# Patient Record
Sex: FEMALE | Employment: OTHER | URBAN - METROPOLITAN AREA
[De-identification: names, ages, dates, MRNs, and addresses within clinical notes are randomized per-mention and may not be internally consistent; named-entity substitution may affect disease eponyms.]

---

## 2023-08-17 ENCOUNTER — OFFICE VISIT (OUTPATIENT)
Age: 73
End: 2023-08-17
Payer: COMMERCIAL

## 2023-08-17 VITALS
WEIGHT: 207 LBS | BODY MASS INDEX: 33.27 KG/M2 | SYSTOLIC BLOOD PRESSURE: 122 MMHG | TEMPERATURE: 97.9 F | HEIGHT: 66 IN | HEART RATE: 64 BPM | DIASTOLIC BLOOD PRESSURE: 77 MMHG | RESPIRATION RATE: 16 BRPM | OXYGEN SATURATION: 96 %

## 2023-08-17 DIAGNOSIS — Z76.89 ENCOUNTER TO ESTABLISH CARE: Primary | ICD-10-CM

## 2023-08-17 DIAGNOSIS — I10 ESSENTIAL HYPERTENSION: ICD-10-CM

## 2023-08-17 DIAGNOSIS — Z86.73 HISTORY OF STROKE: ICD-10-CM

## 2023-08-17 DIAGNOSIS — F03.A0 MILD DEMENTIA WITHOUT BEHAVIORAL DISTURBANCE, PSYCHOTIC DISTURBANCE, MOOD DISTURBANCE, OR ANXIETY, UNSPECIFIED DEMENTIA TYPE (HCC): ICD-10-CM

## 2023-08-17 PROCEDURE — 3074F SYST BP LT 130 MM HG: CPT | Performed by: NURSE PRACTITIONER

## 2023-08-17 PROCEDURE — 3078F DIAST BP <80 MM HG: CPT | Performed by: NURSE PRACTITIONER

## 2023-08-17 PROCEDURE — 1123F ACP DISCUSS/DSCN MKR DOCD: CPT | Performed by: NURSE PRACTITIONER

## 2023-08-17 PROCEDURE — 99214 OFFICE O/P EST MOD 30 MIN: CPT | Performed by: NURSE PRACTITIONER

## 2023-08-17 RX ORDER — UBIDECARENONE 100 MG
CAPSULE ORAL
COMMUNITY

## 2023-08-17 RX ORDER — DOXYCYCLINE HYCLATE 100 MG/1
100 CAPSULE ORAL 2 TIMES DAILY
COMMUNITY
Start: 2023-06-13

## 2023-08-17 RX ORDER — LANOLIN ALCOHOL/MO/W.PET/CERES
500 CREAM (GRAM) TOPICAL NIGHTLY
COMMUNITY

## 2023-08-17 RX ORDER — HYDROCHLOROTHIAZIDE 25 MG/1
1 TABLET ORAL DAILY
COMMUNITY
Start: 2023-05-19

## 2023-08-17 RX ORDER — ACETAMINOPHEN 500 MG
TABLET ORAL EVERY 6 HOURS PRN
COMMUNITY

## 2023-08-17 RX ORDER — FEXOFENADINE HCL 180 MG/1
TABLET ORAL
COMMUNITY

## 2023-08-17 RX ORDER — MEMANTINE HYDROCHLORIDE 5 MG/1
TABLET ORAL
COMMUNITY
Start: 2023-06-15

## 2023-08-17 RX ORDER — SAW/PYGEUM/BETA/HERB/D3/B6/ZN 30 MG-25MG
5 CAPSULE ORAL
COMMUNITY
End: 2023-08-17 | Stop reason: CLARIF

## 2023-08-17 RX ORDER — FLUTICASONE PROPIONATE 50 MCG
SPRAY, SUSPENSION (ML) NASAL
COMMUNITY
Start: 2022-05-03

## 2023-08-17 RX ORDER — EZETIMIBE 10 MG/1
1 TABLET ORAL NIGHTLY
COMMUNITY
Start: 2023-06-08

## 2023-08-17 SDOH — ECONOMIC STABILITY: HOUSING INSECURITY
IN THE LAST 12 MONTHS, WAS THERE A TIME WHEN YOU DID NOT HAVE A STEADY PLACE TO SLEEP OR SLEPT IN A SHELTER (INCLUDING NOW)?: PATIENT REFUSED

## 2023-08-17 SDOH — ECONOMIC STABILITY: INCOME INSECURITY: HOW HARD IS IT FOR YOU TO PAY FOR THE VERY BASICS LIKE FOOD, HOUSING, MEDICAL CARE, AND HEATING?: PATIENT DECLINED

## 2023-08-17 SDOH — ECONOMIC STABILITY: FOOD INSECURITY: WITHIN THE PAST 12 MONTHS, THE FOOD YOU BOUGHT JUST DIDN'T LAST AND YOU DIDN'T HAVE MONEY TO GET MORE.: PATIENT DECLINED

## 2023-08-17 SDOH — ECONOMIC STABILITY: FOOD INSECURITY: WITHIN THE PAST 12 MONTHS, YOU WORRIED THAT YOUR FOOD WOULD RUN OUT BEFORE YOU GOT MONEY TO BUY MORE.: PATIENT DECLINED

## 2023-08-17 ASSESSMENT — PATIENT HEALTH QUESTIONNAIRE - PHQ9
7. TROUBLE CONCENTRATING ON THINGS, SUCH AS READING THE NEWSPAPER OR WATCHING TELEVISION: 0
SUM OF ALL RESPONSES TO PHQ QUESTIONS 1-9: 8
SUM OF ALL RESPONSES TO PHQ QUESTIONS 1-9: 8
10. IF YOU CHECKED OFF ANY PROBLEMS, HOW DIFFICULT HAVE THESE PROBLEMS MADE IT FOR YOU TO DO YOUR WORK, TAKE CARE OF THINGS AT HOME, OR GET ALONG WITH OTHER PEOPLE: 1
5. POOR APPETITE OR OVEREATING: 0
2. FEELING DOWN, DEPRESSED OR HOPELESS: 1
SUM OF ALL RESPONSES TO PHQ QUESTIONS 1-9: 8
6. FEELING BAD ABOUT YOURSELF - OR THAT YOU ARE A FAILURE OR HAVE LET YOURSELF OR YOUR FAMILY DOWN: 1
4. FEELING TIRED OR HAVING LITTLE ENERGY: 1
SUM OF ALL RESPONSES TO PHQ QUESTIONS 1-9: 8
3. TROUBLE FALLING OR STAYING ASLEEP: 2
SUM OF ALL RESPONSES TO PHQ9 QUESTIONS 1 & 2: 1
9. THOUGHTS THAT YOU WOULD BE BETTER OFF DEAD, OR OF HURTING YOURSELF: 0
8. MOVING OR SPEAKING SO SLOWLY THAT OTHER PEOPLE COULD HAVE NOTICED. OR THE OPPOSITE, BEING SO FIGETY OR RESTLESS THAT YOU HAVE BEEN MOVING AROUND A LOT MORE THAN USUAL: 3
1. LITTLE INTEREST OR PLEASURE IN DOING THINGS: 0

## 2023-08-17 NOTE — PROGRESS NOTES
Chief Complaint   Patient presents with    New Patient       1. Have you been to the ER, urgent care clinic since your last visit? Hospitalized since your last visit?         no    2. Have you seen or consulted any other health care providers outside of the [address] [place] [address] since your last visit? Include any pap smears or colon screening. no    3. For patients over 45: Has the patient had a colonoscopy? No    If the patient is female:    4. For patients over 40: Has the patient had a mammogram? No    5. For patients over 21: Has the patient had a pap smear? N/A        No flowsheet data found. No flowsheet data found. Health Maintenance Due   Topic Date Due    Depression Screen  Never done    Hepatitis C screen  Never done    DTaP/Tdap/Td vaccine (1 - Tdap) Never done    Lipids  Never done    Colorectal Cancer Screen  Never done    Breast cancer screen  Never done    DEXA (modify frequency per FRAX score)  Never done    Shingles vaccine (2 of 2) 02/07/2019    COVID-19 Vaccine (3 - Booster for Moderna series) 05/27/2021    Flu vaccine (1) 08/01/2023       PHQ-9  8/17/2023   Little interest or pleasure in doing things 0   Feeling down, depressed, or hopeless 1   Trouble falling or staying asleep, or sleeping too much 2   Feeling tired or having little energy 1   Poor appetite or overeating 0   Feeling bad about yourself - or that you are a failure or have let yourself or your family down 1   Trouble concentrating on things, such as reading the newspaper or watching television 0   Moving or speaking so slowly that other people could have noticed.  Or the opposite - being so fidgety or restless that you have been moving around a lot more than usual 3   Thoughts that you would be better off dead, or of hurting yourself in some way 0   PHQ-2 Score 1   PHQ-9 Total Score 8   If you checked off any problems, how difficult have these problems made it for you to do your work, take care of things at home, or

## 2023-08-17 NOTE — PROGRESS NOTES
HPI:   Hernesto Canales is a 68 y.o. female who presents to establish care. She has moved from [place] this year to live with her daughter and son-in law. She is accompanied by her granddaughter. They are well without concerns. She lost her  in February this year. There is a history of dementia that has worsened over time. She does not drive. She has established with neurology. She is now living with her daughter. Current Outpatient Medications   Medication Sig Dispense Refill    Aspirin Buf,CaCarb-MgCarb-MgO, 81 MG TABS Take 1 tablet by mouth daily      Calcium 600-10 MG-MCG CHEW Take 2 capsules by mouth daily      fexofenadine (ALLEGRA) 180 MG tablet Take by mouth      Multiple Vitamin (MULTIVITAMIN ADULT PO) Take 1 tablet by mouth daily      Probiotic Product (PROBIOTIC-10 PO) Probiotic      Propylene Glycol 0.6 % SOLN Apply 1 drop to eye 2 times daily as needed      acetaminophen (TYLENOL) 500 MG tablet Take by mouth every 6 hours as needed      coenzyme Q10 100 MG CAPS capsule coenzyme Q10      doxycycline hyclate (VIBRAMYCIN) 100 MG capsule Take 1 capsule by mouth 2 times daily      ezetimibe (ZETIA) 10 MG tablet Take 1 tablet by mouth nightly      fluticasone (FLONASE) 50 MCG/ACT nasal spray SPRAY 2 SPRAYS IN EACH NOSTRIL DAILY      hydroCHLOROthiazide (HYDRODIURIL) 25 MG tablet Take 1 tablet by mouth daily      memantine (NAMENDA) 5 MG tablet Take 5mg once in the morning for one week. Then increase to 5mg twice a day (one in morning one at night) for one week. Then increase to 10mg in the morning and 5mg at night for one week. And finally increase to 10mg twice a day indefinitely. Metoprolol Succinate 100 MG CS24 Take 1 capsule every day by oral route. niacin (SLO-NIACIN) 500 MG extended release tablet Take 1 tablet by mouth nightly       No current facility-administered medications for this visit.       Allergies   Allergen Reactions    Wasp Venom Protein

## 2023-08-23 PROBLEM — I10 ESSENTIAL HYPERTENSION: Status: ACTIVE | Noted: 2023-08-23

## 2023-08-23 PROBLEM — F03.A0 MILD DEMENTIA WITHOUT BEHAVIORAL DISTURBANCE, PSYCHOTIC DISTURBANCE, MOOD DISTURBANCE, OR ANXIETY (HCC): Status: ACTIVE | Noted: 2023-08-23

## 2023-08-23 PROBLEM — Z86.73 HISTORY OF STROKE: Status: ACTIVE | Noted: 2023-08-23

## 2023-08-23 ASSESSMENT — ENCOUNTER SYMPTOMS: SHORTNESS OF BREATH: 0

## 2023-09-10 ENCOUNTER — PATIENT MESSAGE (OUTPATIENT)
Age: 73
End: 2023-09-10

## 2023-09-12 RX ORDER — SERTRALINE HYDROCHLORIDE 100 MG/1
150 TABLET, FILM COATED ORAL DAILY
Qty: 45 TABLET | Refills: 3 | Status: SHIPPED | OUTPATIENT
Start: 2023-09-12

## 2023-09-12 RX ORDER — ATORVASTATIN CALCIUM 20 MG/1
20 TABLET, FILM COATED ORAL DAILY
Qty: 30 TABLET | Refills: 3 | Status: SHIPPED | OUTPATIENT
Start: 2023-09-12

## 2023-09-13 NOTE — TELEPHONE ENCOUNTER
From: Nataly Fair  To: Sebastian Kota Dumont  Sent: 9/10/2023 11:46 AM EDT  Subject: Medication Clarification Cont. 3. By the Niacinamide, you judy a line and wrote,\"Pellagra treatment-discontinuing. \" However, it is still on the list of medications she should continue taking. Should I be giving Niacinamide to her or not? 4. By Focus Factor, Potassium Chloride, Centrum Silver, and Biotin you judy lines and indicated that they were optional. They were also not listed on the [address] [address] page, so I can stop giving all of these to her. Correct? Should I stop them all at once, or remove them gradually? 5. FYI: There is a red line through the Prevagen, but I'm not sure if you judy it or I did. In any case, I stopped giving that one to ANBAELLE on August 20, 2023. 6. Her new psychiatric , NP, [facility], had prescribed Lorazepam for her starting 8/8/23. It was only slightly helpful and helping her to sleep. Both you and the doctor we saw when we took MILLERDAL for her neuro-psychological assessment on 9/1/2023 noted that it can worsen her memory, and she seemed to be getting increasingly foggy during that time, so I stopped giving that to her. You noted we should consider alternatives. Do you have any suggestions? Thank you for any advise you can provide!     Sincerely,    Flex Romero

## 2023-11-17 ENCOUNTER — OFFICE VISIT (OUTPATIENT)
Age: 73
End: 2023-11-17
Payer: COMMERCIAL

## 2023-11-17 VITALS
BODY MASS INDEX: 32.66 KG/M2 | DIASTOLIC BLOOD PRESSURE: 80 MMHG | TEMPERATURE: 100 F | SYSTOLIC BLOOD PRESSURE: 138 MMHG | WEIGHT: 203.2 LBS | OXYGEN SATURATION: 97 % | RESPIRATION RATE: 16 BRPM | HEART RATE: 72 BPM | HEIGHT: 66 IN

## 2023-11-17 DIAGNOSIS — I10 ESSENTIAL HYPERTENSION: ICD-10-CM

## 2023-11-17 DIAGNOSIS — F02.A0 MILD DEMENTIA ASSOCIATED WITH OTHER UNDERLYING DISEASE, WITHOUT BEHAVIORAL DISTURBANCE, PSYCHOTIC DISTURBANCE, MOOD DISTURBANCE, OR ANXIETY (HCC): Primary | ICD-10-CM

## 2023-11-17 PROCEDURE — 3078F DIAST BP <80 MM HG: CPT | Performed by: NURSE PRACTITIONER

## 2023-11-17 PROCEDURE — 1123F ACP DISCUSS/DSCN MKR DOCD: CPT | Performed by: NURSE PRACTITIONER

## 2023-11-17 PROCEDURE — 3074F SYST BP LT 130 MM HG: CPT | Performed by: NURSE PRACTITIONER

## 2023-11-17 PROCEDURE — 99213 OFFICE O/P EST LOW 20 MIN: CPT | Performed by: NURSE PRACTITIONER

## 2023-11-17 RX ORDER — SERTRALINE HYDROCHLORIDE 100 MG/1
100 TABLET, FILM COATED ORAL DAILY
Qty: 30 TABLET | Refills: 3
Start: 2023-11-17

## 2023-11-17 RX ORDER — LEVOTHYROXINE SODIUM 0.03 MG/1
25 TABLET ORAL DAILY
COMMUNITY
Start: 2023-10-30

## 2023-11-17 RX ORDER — LOSARTAN POTASSIUM 100 MG/1
1 TABLET ORAL DAILY
COMMUNITY
Start: 2023-06-08

## 2023-11-17 RX ORDER — DIPHENHYDRAMINE HCL 25 MG
25 CAPSULE ORAL EVERY 6 HOURS PRN
COMMUNITY

## 2023-11-17 RX ORDER — DONEPEZIL HYDROCHLORIDE 5 MG/1
5 TABLET, FILM COATED ORAL NIGHTLY
COMMUNITY
Start: 2023-10-19

## 2023-11-17 NOTE — PROGRESS NOTES
Assessment/Plan:     1. 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 She is followed by neurology. 2. Essential hypertension   Stable on current therapy at this time. No follow-ups on file. Discussed expected course/resolution/complications of diagnosis in detail with patient. Medication risks/benefits/costs/interactions/alternatives discussed with patient. Pt was given after visit summary which includes diagnoses, current medications & vitals. Pt expressed understanding with the diagnosis and plan          Subjective:      Idalmis Orellana is a 68 y.o. female who presents for had concerns including Follow-up, Loss of Vision, and Skin Problem. She is accompanied by her daughter. Vision is getting worse. Followed by Dr. Matt Winston. Her dementia is worsening. They are considering home placement. Cardiovascular Review:  The patient has hypertension, hyperlipidemia, history of prior stroke, and obesity. Diet and Lifestyle: not attempting to follow a low fat, low cholesterol diet, sedentary  Home BP Monitoring: is not measured at home. Pertinent ROS: taking medications as instructed, no medication side effects noted, no TIA's, no chest pain on exertion, no dyspnea on exertion, no swelling of ankles.      Patient Active Problem List   Diagnosis    Mild dementia without behavioral disturbance, psychotic disturbance, mood disturbance, or anxiety ([facility])    History of stroke    Essential hypertension       Current Outpatient Medications   Medication Sig Dispense Refill    diphenhydrAMINE (BENADRYL ALLERGY) 25 MG capsule Take 1 capsule by mouth every 6 hours as needed      donepezil (ARICEPT) 5 MG tablet Take 1 tablet by mouth nightly      levothyroxine (SYNTHROID) 25 MCG tablet Take 1 tablet by mouth daily      losartan

## 2023-11-28 ENCOUNTER — TELEPHONE (OUTPATIENT)
Age: 73
End: 2023-11-28

## 2023-11-28 RX ORDER — HYDROCHLOROTHIAZIDE 25 MG/1
25 TABLET ORAL DAILY
Qty: 90 TABLET | Refills: 3 | Status: SHIPPED | OUTPATIENT
Start: 2023-11-28

## 2023-11-28 NOTE — TELEPHONE ENCOUNTER
Received a request for Ezatimibe 10mg. I did not see where this is a current medication . It seems it was discontinued.

## 2023-11-28 NOTE — TELEPHONE ENCOUNTER
Pharmacy called received prescription for metoprolol succinate 100 mg the capsule is not cover wants to know if it could be change to tablets.     Requesting a call back    Best call back #[phone]

## 2023-11-29 ASSESSMENT — ENCOUNTER SYMPTOMS: SHORTNESS OF BREATH: 0

## 2023-11-29 NOTE — TELEPHONE ENCOUNTER
Spoke with Poli Castillo at [facility] and informed her that the tablet is fine per Tim.

## 2023-12-28 NOTE — TELEPHONE ENCOUNTER
PCP: Evangelina Vasques, APRN - NP    Last appt: 11/17/2023   No future appointments.     Requested Prescriptions     Pending Prescriptions Disp Refills    atorvastatin (LIPITOR) 20 MG tablet [Pharmacy Med Name: ATORVASTATIN 20 MG TAB[*]] 30 tablet 3     Sig: TAKE ONE TABLET BY MOUTH ONE TIME DAILY         Prior labs and Blood pressures:  BP Readings from Last 3 Encounters:   11/17/23 138/80   08/17/23 122/77     No results found for: \"NA\", \"K\", \"CL\", \"CO2\", \"AGAP\", \"GLU\", \"BUN\", \"CREA\", \"GFRAA\", \"CA\"  No results found for: \"HBA1C\", \"[id number]\"  No results found for: \"CHOL\", \"CHOLPOCT\", \"CHOLX\", \"CHLST\", \"CHOLV\", \"HDL\", \"HDLPOC\", \"HDLC\", \"LDL\", \"LDLC\", \"VLDLC\", \"VLDL\", \"TGLX\", \"TRIGL\"  No results found for: \"VITD3\", \"VD3RIA\"    No results found for: \"TSH\", \"TSH2\", \"TSH3\"

## 2023-12-29 RX ORDER — ATORVASTATIN CALCIUM 20 MG/1
20 TABLET, FILM COATED ORAL DAILY
Qty: 30 TABLET | Refills: 3 | Status: SHIPPED | OUTPATIENT
Start: 2023-12-29

## 2024-03-12 NOTE — TELEPHONE ENCOUNTER
PCP: Laurie Dumont APRN - NP    Last appt: 11/17/2023     No future appointments.    Requested Prescriptions     Pending Prescriptions Disp Refills    losartan (COZAAR) 100 MG tablet [Pharmacy Med Name: LOSARTAN POTASSIUM 100 MG TAB] 90 tablet 1     Sig: TAKE ONE TABLET BY MOUTH ONE TIME DAILY       Prior labs and Blood pressures:  BP Readings from Last 3 Encounters:   11/17/23 138/80   08/17/23 122/77     No results found for: \"NA\", \"K\", \"CL\", \"CO2\", \"AGAP\", \"GLU\", \"BUN\", \"CREA\", \"GFRAA\", \"CA\"  No results found for: \"HBA1C\", \"[id number]\"  No results found for: \"CHOL\", \"CHOLPOCT\", \"CHOLX\", \"CHLST\", \"CHOLV\", \"HDL\", \"HDLPOC\", \"HDLC\", \"LDL\", \"LDLC\", \"VLDLC\", \"VLDL\", \"TGLX\", \"TRIGL\"  No results found for: \"VITD3\", \"VD3RIA\"    No results found for: \"TSH\", \"TSH2\", \"TSH3\"

## 2024-03-13 RX ORDER — LOSARTAN POTASSIUM 100 MG/1
100 TABLET ORAL DAILY
Qty: 90 TABLET | Refills: 1 | Status: SHIPPED | OUTPATIENT
Start: 2024-03-13

## 2024-03-15 NOTE — TELEPHONE ENCOUNTER
Left voicemail to callback please inform the pt that her Losartan 100 MG was approved.However pt need's to make an In Office Appt with TREY Dumont.

## 2024-03-25 RX ORDER — LOSARTAN POTASSIUM 100 MG/1
100 TABLET ORAL DAILY
Qty: 90 TABLET | Refills: 1 | Status: SHIPPED | OUTPATIENT
Start: 2024-03-25

## 2024-03-25 RX ORDER — SERTRALINE HYDROCHLORIDE 100 MG/1
TABLET, FILM COATED ORAL
Qty: 45 TABLET | Refills: 3 | Status: SHIPPED | OUTPATIENT
Start: 2024-03-25

## 2024-03-25 NOTE — TELEPHONE ENCOUNTER
PCP: Laurie Dumont APRN - NP    Last appt: 11/17/2023   Future Appointments   Date Time Provider Department Center   5/7/2024  1:45 PM Laurie Dumont APRN - NP [facility]       Requested Prescriptions     Pending Prescriptions Disp Refills    sertraline (ZOLOFT) 100 MG tablet [Pharmacy Med Name: SERTRALINE 100 MG TAB[*]] 45 tablet 3     Sig: TAKE ONE AND ONE-HALF TABLETS BY MOUTH ONE TIME DAILY    losartan (COZAAR) 100 MG tablet [Pharmacy Med Name: LOSARTAN POTASSIUM 100 MG TAB] 90 tablet 1     Sig: TAKE ONE TABLET BY MOUTH ONE TIME DAILY         Prior labs and Blood pressures:  BP Readings from Last 3 Encounters:   11/17/23 138/80   08/17/23 122/77     No results found for: \"NA\", \"K\", \"CL\", \"CO2\", \"AGAP\", \"GLU\", \"BUN\", \"CREA\", \"GFRAA\", \"CA\"  No results found for: \"HBA1C\", \"[id number]\"  No results found for: \"CHOL\", \"CHOLPOCT\", \"CHOLX\", \"CHLST\", \"CHOLV\", \"HDL\", \"HDLPOC\", \"HDLC\", \"LDL\", \"LDLC\", \"VLDLC\", \"VLDL\", \"TGLX\", \"TRIGL\"  No results found for: \"VITD3\", \"VD3RIA\"    No results found for: \"TSH\", \"TSH2\", \"TSH3\"

## 2024-04-23 NOTE — TELEPHONE ENCOUNTER
PCP: Laurie Dumont APRN - NP    Last appt: 11/17/2023   Future Appointments   Date Time Provider Department Center   5/7/2024  1:45 PM Laurie Dumont APRN - NP [facility]       Requested Prescriptions     Pending Prescriptions Disp Refills    atorvastatin (LIPITOR) 20 MG tablet [Pharmacy Med Name: ATORVASTATIN 20 MG TAB[*]] 30 tablet 3     Sig: TAKE ONE TABLET BY MOUTH ONE TIME DAILY    sertraline (ZOLOFT) 25 MG tablet [Pharmacy Med Name: SERTRALINE 25 MG TAB[*]] 30 tablet 1     Sig: TAKE ONE TABLET BY MOUTH ONE TIME DAILY         Prior labs and Blood pressures:  BP Readings from Last 3 Encounters:   11/17/23 138/80   08/17/23 122/77     No results found for: \"NA\", \"K\", \"CL\", \"CO2\", \"AGAP\", \"GLU\", \"BUN\", \"GFRAA\", \"CA\"  No results found for: \"HBA1C\", \"[id number]\"  No results found for: \"CHOL\", \"CHOLPOCT\", \"CHOLX\", \"CHLST\", \"CHOLV\", \"HDL\", \"HDLPOC\", \"HDLC\", \"LDL\", \"LDLC\", \"VLDLC\", \"VLDL\", \"TGLX\", \"TRIGL\"  No results found for: \"VITD3\", \"VD3RIA\"    No results found for: \"TSH\", \"TSH2\", \"TSH3\"

## 2024-04-24 RX ORDER — ATORVASTATIN CALCIUM 20 MG/1
20 TABLET, FILM COATED ORAL DAILY
Qty: 30 TABLET | Refills: 3 | Status: SHIPPED | OUTPATIENT
Start: 2024-04-24

## 2024-04-24 RX ORDER — SERTRALINE HYDROCHLORIDE 25 MG/1
25 TABLET, FILM COATED ORAL DAILY
Qty: 30 TABLET | Refills: 1 | Status: SHIPPED | OUTPATIENT
Start: 2024-04-24

## 2024-05-07 ENCOUNTER — OFFICE VISIT (OUTPATIENT)
Age: 74
End: 2024-05-07
Payer: COMMERCIAL

## 2024-05-07 VITALS
TEMPERATURE: 98.4 F | SYSTOLIC BLOOD PRESSURE: 120 MMHG | RESPIRATION RATE: 16 BRPM | OXYGEN SATURATION: 98 % | WEIGHT: 211.8 LBS | HEIGHT: 66 IN | DIASTOLIC BLOOD PRESSURE: 76 MMHG | HEART RATE: 69 BPM | BODY MASS INDEX: 34.04 KG/M2

## 2024-05-07 DIAGNOSIS — I10 ESSENTIAL HYPERTENSION: Primary | ICD-10-CM

## 2024-05-07 DIAGNOSIS — Z12.11 SCREENING FOR COLON CANCER: ICD-10-CM

## 2024-05-07 DIAGNOSIS — E03.9 ACQUIRED HYPOTHYROIDISM: ICD-10-CM

## 2024-05-07 PROCEDURE — 99214 OFFICE O/P EST MOD 30 MIN: CPT | Performed by: NURSE PRACTITIONER

## 2024-05-07 PROCEDURE — 3074F SYST BP LT 130 MM HG: CPT | Performed by: NURSE PRACTITIONER

## 2024-05-07 PROCEDURE — 1123F ACP DISCUSS/DSCN MKR DOCD: CPT | Performed by: NURSE PRACTITIONER

## 2024-05-07 PROCEDURE — 3078F DIAST BP <80 MM HG: CPT | Performed by: NURSE PRACTITIONER

## 2024-05-07 RX ORDER — CHLORAL HYDRATE 500 MG
1000 CAPSULE ORAL DAILY
COMMUNITY

## 2024-05-07 RX ORDER — RIVASTIGMINE 4.6 MG/24H
PATCH, EXTENDED RELEASE TRANSDERMAL
COMMUNITY

## 2024-05-07 ASSESSMENT — PATIENT HEALTH QUESTIONNAIRE - PHQ9
1. LITTLE INTEREST OR PLEASURE IN DOING THINGS: NOT AT ALL
3. TROUBLE FALLING OR STAYING ASLEEP: NOT AT ALL
7. TROUBLE CONCENTRATING ON THINGS, SUCH AS READING THE NEWSPAPER OR WATCHING TELEVISION: NOT AT ALL
5. POOR APPETITE OR OVEREATING: NOT AT ALL
2. FEELING DOWN, DEPRESSED OR HOPELESS: SEVERAL DAYS
4. FEELING TIRED OR HAVING LITTLE ENERGY: NOT AT ALL
SUM OF ALL RESPONSES TO PHQ QUESTIONS 1-9: 1
SUM OF ALL RESPONSES TO PHQ QUESTIONS 1-9: 1
SUM OF ALL RESPONSES TO PHQ9 QUESTIONS 1 & 2: 1
10. IF YOU CHECKED OFF ANY PROBLEMS, HOW DIFFICULT HAVE THESE PROBLEMS MADE IT FOR YOU TO DO YOUR WORK, TAKE CARE OF THINGS AT HOME, OR GET ALONG WITH OTHER PEOPLE: NOT DIFFICULT AT ALL
9. THOUGHTS THAT YOU WOULD BE BETTER OFF DEAD, OR OF HURTING YOURSELF: NOT AT ALL
SUM OF ALL RESPONSES TO PHQ QUESTIONS 1-9: 1
6. FEELING BAD ABOUT YOURSELF - OR THAT YOU ARE A FAILURE OR HAVE LET YOURSELF OR YOUR FAMILY DOWN: NOT AT ALL
8. MOVING OR SPEAKING SO SLOWLY THAT OTHER PEOPLE COULD HAVE NOTICED. OR THE OPPOSITE, BEING SO FIGETY OR RESTLESS THAT YOU HAVE BEEN MOVING AROUND A LOT MORE THAN USUAL: NOT AT ALL
SUM OF ALL RESPONSES TO PHQ QUESTIONS 1-9: 1

## 2024-05-07 NOTE — PROGRESS NOTES
Assessment/Plan:     1. Essential hypertension  -     Comprehensive Metabolic Panel; Future  -     Lipid Panel; Future  2. Screening for colon cancer  -     Cologuard (Fecal DNA Colorectal Cancer Screening)  3. Acquired hypothyroidism  -     TSH; Future       Return in about 6 months (around 11/7/2024) for Medicare Wellness Exam.     Discussed expected course/resolution/complications of diagnosis in detail with patient.    Medication risks/benefits/costs/interactions/alternatives discussed with patient.    Pt was given after visit summary which includes diagnoses, current medications & vitals.   Pt expressed understanding with the diagnosis and plan          Subjective:      Joellen Ga is a 74 y.o. female who presents for had concerns including Follow-up.     She is now at [facility].      Cardiovascular Review:  The patient has hypertension and obesity.  Diet and Lifestyle: not attempting to follow a low fat, low cholesterol diet, sedentary  Home BP Monitoring: is not measured at home.  Pertinent ROS: taking medications as instructed, no medication side effects noted, no TIA's, no chest pain on exertion, no dyspnea on exertion, no swelling of ankles.     Patient Active Problem List   Diagnosis    Mild dementia without behavioral disturbance, psychotic disturbance, mood disturbance, or anxiety ([facility])    History of stroke    Essential hypertension       Current Outpatient Medications   Medication Sig Dispense Refill    Omega-3 Fatty Acids (FISH OIL) 1000 MG capsule Take 1 capsule by mouth daily      rivastigmine (EXELON) 4.6 MG/24HR APPLY ONE PATCH TO THE SKIN ONE TIME DAILY      atorvastatin (LIPITOR) 20 MG tablet TAKE ONE TABLET BY MOUTH ONE TIME DAILY 30 tablet 3    sertraline (ZOLOFT) 25 MG tablet TAKE ONE TABLET BY MOUTH ONE TIME DAILY 30 tablet 1    sertraline (ZOLOFT) 100 MG tablet TAKE ONE AND ONE-HALF TABLETS BY MOUTH ONE TIME DAILY 45 tablet 3    losartan (COZAAR) 100 MG tablet

## 2024-05-07 NOTE — PROGRESS NOTES
Chief Complaint   Patient presents with    Follow-up     \"Have you been to the ER, urgent care clinic since your last visit?  Hospitalized since your last visit?\"    NO    “Have you seen or consulted any other health care providers outside of [facility] since your last visit?”    YES - When: approximately 1 months ago.  Where and Why: [facility].    “Have you had a colorectal cancer screening such as a colonoscopy/FIT/Cologuard?    NO    No colonoscopy on file  No cologuard on file  No FIT/FOBT on file   No flexible sigmoidoscopy on file

## 2024-05-08 LAB
ALBUMIN SERPL-MCNC: 3.7 G/DL (ref 3.5–5)
ALBUMIN/GLOB SERPL: 1.2 (ref 1.1–2.2)
ALP SERPL-CCNC: 79 U/L (ref 45–117)
ALT SERPL-CCNC: 22 U/L (ref 12–78)
ANION GAP SERPL CALC-SCNC: 5 MMOL/L (ref 5–15)
AST SERPL-CCNC: 19 U/L (ref 15–37)
BILIRUB SERPL-MCNC: 0.8 MG/DL (ref 0.2–1)
BUN SERPL-MCNC: 17 MG/DL (ref 6–20)
BUN/CREAT SERPL: 24 (ref 12–20)
CALCIUM SERPL-MCNC: 9.7 MG/DL (ref 8.5–10.1)
CHLORIDE SERPL-SCNC: 107 MMOL/L (ref 97–108)
CHOLEST SERPL-MCNC: 192 MG/DL
CO2 SERPL-SCNC: 29 MMOL/L (ref 21–32)
CREAT SERPL-MCNC: 0.7 MG/DL (ref 0.55–1.02)
GLOBULIN SER CALC-MCNC: 3.2 G/DL (ref 2–4)
GLUCOSE SERPL-MCNC: 96 MG/DL (ref 65–100)
HDLC SERPL-MCNC: 64 MG/DL
HDLC SERPL: 3 (ref 0–5)
LDLC SERPL CALC-MCNC: 100 MG/DL (ref 0–100)
POTASSIUM SERPL-SCNC: 3.9 MMOL/L (ref 3.5–5.1)
PROT SERPL-MCNC: 6.9 G/DL (ref 6.4–8.2)
SODIUM SERPL-SCNC: 141 MMOL/L (ref 136–145)
TRIGL SERPL-MCNC: 140 MG/DL
TSH SERPL DL<=0.05 MIU/L-ACNC: 1.2 UIU/ML (ref 0.36–3.74)
VLDLC SERPL CALC-MCNC: 28 MG/DL

## 2024-05-16 ASSESSMENT — ENCOUNTER SYMPTOMS: SHORTNESS OF BREATH: 0

## 2024-06-17 NOTE — TELEPHONE ENCOUNTER
PCP: Laurie Dumont APRN - NP    Last appt: 5/7/2024   Future Appointments   Date Time Provider Department Center   11/27/2024  1:30 PM Laurie Dumont APRN - NP [facility]       Requested Prescriptions     Pending Prescriptions Disp Refills    sertraline (ZOLOFT) 25 MG tablet [Pharmacy Med Name: SERTRALINE 25 MG TAB[*]] 30 tablet 1     Sig: TAKE ONE TABLET BY MOUTH ONE TIME DAILY         Prior labs and Blood pressures:  BP Readings from Last 3 Encounters:   05/07/24 120/76   11/17/23 138/80   08/17/23 122/77     Lab Results   Component Value Date/Time     05/07/2024 02:50 PM    K 3.9 05/07/2024 02:50 PM     05/07/2024 02:50 PM    CO2 29 05/07/2024 02:50 PM    BUN 17 05/07/2024 02:50 PM     No results found for: \"HBA1C\", \"[id number]\"  Lab Results   Component Value Date/Time    CHOL 192 05/07/2024 02:50 PM    HDL 64 05/07/2024 02:50 PM     05/07/2024 02:50 PM    VLDL 28 05/07/2024 02:50 PM     No results found for: \"VITD3\"    No results found for: \"TSH\", \"TSH2\", \"TSH3\"

## 2024-06-18 RX ORDER — SERTRALINE HYDROCHLORIDE 25 MG/1
25 TABLET, FILM COATED ORAL DAILY
Qty: 90 TABLET | Refills: 1 | Status: SHIPPED | OUTPATIENT
Start: 2024-06-18

## 2024-06-30 LAB — NONINV COLON CA DNA+OCC BLD SCRN STL QL: NEGATIVE

## 2024-07-01 ENCOUNTER — PATIENT MESSAGE (OUTPATIENT)
Age: 74
End: 2024-07-01

## 2024-07-02 RX ORDER — LEVOTHYROXINE SODIUM 0.03 MG/1
25 TABLET ORAL DAILY
Qty: 90 TABLET | Refills: 0 | Status: SHIPPED | OUTPATIENT
Start: 2024-07-02

## 2024-07-02 RX ORDER — LEVOTHYROXINE SODIUM 0.03 MG/1
25 TABLET ORAL DAILY
Qty: 30 TABLET | Status: CANCELLED | OUTPATIENT
Start: 2024-07-02

## 2024-07-02 NOTE — TELEPHONE ENCOUNTER
PCP: Laurie Dumont APRN - NP    Last appt: 5/7/2024   Future Appointments   Date Time Provider Department Center   11/27/2024  1:30 PM Laurie Dumont APRN - NP [facility]       Requested Prescriptions      No prescriptions requested or ordered in this encounter         Prior labs and Blood pressures:  BP Readings from Last 3 Encounters:   05/07/24 120/76   11/17/23 138/80   08/17/23 122/77     Lab Results   Component Value Date/Time     05/07/2024 02:50 PM    K 3.9 05/07/2024 02:50 PM     05/07/2024 02:50 PM    CO2 29 05/07/2024 02:50 PM    BUN 17 05/07/2024 02:50 PM     No results found for: \"HBA1C\", \"[id number]\"  Lab Results   Component Value Date/Time    CHOL 192 05/07/2024 02:50 PM    HDL 64 05/07/2024 02:50 PM     05/07/2024 02:50 PM    VLDL 28 05/07/2024 02:50 PM     No results found for: \"VITD3\"    No results found for: \"TSH\", \"TSH2\", \"TSH3\"

## 2024-07-24 RX ORDER — LEVOTHYROXINE SODIUM 0.03 MG/1
25 TABLET ORAL DAILY
Qty: 90 TABLET | Refills: 3 | Status: SHIPPED | OUTPATIENT
Start: 2024-07-24

## 2024-07-24 RX ORDER — ATORVASTATIN CALCIUM 20 MG/1
20 TABLET, FILM COATED ORAL DAILY
Qty: 30 TABLET | Refills: 3 | Status: SHIPPED | OUTPATIENT
Start: 2024-07-24

## 2024-07-24 NOTE — TELEPHONE ENCOUNTER
PCP: Laurie Dumont APRN - NP    Last appt: 5/7/2024       Future Appointments   Date Time Provider Department Center   11/27/2024  1:30 PM Laurie Dumont APRN - NP [facility]       Requested Prescriptions     Pending Prescriptions Disp Refills    levothyroxine (SYNTHROID) 25 MCG tablet [Pharmacy Med Name: LEVOTHYROXINE 25 MCG TAB[*]] 90 tablet 0     Sig: TAKE ONE TABLET BY MOUTH ONE TIME DAILY       Prior labs and Blood pressures:  BP Readings from Last 3 Encounters:   05/07/24 120/76   11/17/23 138/80   08/17/23 122/77     Lab Results   Component Value Date/Time     05/07/2024 02:50 PM    K 3.9 05/07/2024 02:50 PM     05/07/2024 02:50 PM    CO2 29 05/07/2024 02:50 PM    BUN 17 05/07/2024 02:50 PM     No results found for: \"HBA1C\", \"[id number]\"  Lab Results   Component Value Date/Time    CHOL 192 05/07/2024 02:50 PM    HDL 64 05/07/2024 02:50 PM     05/07/2024 02:50 PM    VLDL 28 05/07/2024 02:50 PM     No results found for: \"VITD3\"    Lab Results   Component Value Date/Time    TSH 1.20 05/07/2024 02:50 PM

## 2024-07-24 NOTE — TELEPHONE ENCOUNTER
PCP: Laurie Dumont APRN - NP      Future Appointments   Date Time Provider Department Center   11/27/2024  1:30 PM Laurie Dumont APRN - NP [facility]       Requested Prescriptions     Pending Prescriptions Disp Refills    atorvastatin (LIPITOR) 20 MG tablet [Pharmacy Med Name: ATORVASTATIN 20 MG TAB[*]] 30 tablet 3     Sig: TAKE ONE TABLET BY MOUTH ONE TIME DAILY       Prior labs and Blood pressures:  BP Readings from Last 3 Encounters:   05/07/24 120/76   11/17/23 138/80   08/17/23 122/77     Lab Results   Component Value Date/Time     05/07/2024 02:50 PM    K 3.9 05/07/2024 02:50 PM     05/07/2024 02:50 PM    CO2 29 05/07/2024 02:50 PM    BUN 17 05/07/2024 02:50 PM     No results found for: \"HBA1C\", \"[id number]\"  Lab Results   Component Value Date/Time    CHOL 192 05/07/2024 02:50 PM    HDL 64 05/07/2024 02:50 PM     05/07/2024 02:50 PM    VLDL 28 05/07/2024 02:50 PM     No results found for: \"VITD3\"    Lab Results   Component Value Date/Time    TSH 1.20 05/07/2024 02:50 PM

## 2024-09-23 RX ORDER — HYDROCHLOROTHIAZIDE 25 MG/1
25 TABLET ORAL DAILY
Qty: 90 TABLET | Refills: 3 | Status: SHIPPED | OUTPATIENT
Start: 2024-09-23

## 2024-09-23 RX ORDER — METOPROLOL SUCCINATE 100 MG/1
100 TABLET, EXTENDED RELEASE ORAL DAILY
Qty: 90 TABLET | Refills: 3 | Status: SHIPPED | OUTPATIENT
Start: 2024-09-23

## 2024-09-23 RX ORDER — LOSARTAN POTASSIUM 100 MG/1
100 TABLET ORAL DAILY
Qty: 90 TABLET | Refills: 1 | Status: SHIPPED | OUTPATIENT
Start: 2024-09-23

## 2024-12-14 ENCOUNTER — OFFICE VISIT (OUTPATIENT)
Age: 74
End: 2024-12-14

## 2024-12-14 VITALS
BODY MASS INDEX: 36.85 KG/M2 | HEIGHT: 65 IN | OXYGEN SATURATION: 95 % | WEIGHT: 221.2 LBS | DIASTOLIC BLOOD PRESSURE: 86 MMHG | HEART RATE: 105 BPM | TEMPERATURE: 97.7 F | SYSTOLIC BLOOD PRESSURE: 105 MMHG

## 2024-12-14 DIAGNOSIS — R05.1 ACUTE COUGH: Primary | ICD-10-CM

## 2024-12-14 DIAGNOSIS — U07.1 COVID-19: ICD-10-CM

## 2024-12-14 LAB
Lab: ABNORMAL
PERFORMING INSTRUMENT: ABNORMAL
QC PASS/FAIL: ABNORMAL
SARS-COV-2, POC: DETECTED

## 2024-12-14 RX ORDER — UBIQUINOL 100 MG
CAPSULE ORAL
COMMUNITY

## 2024-12-14 RX ORDER — GUAIFENESIN/DEXTROMETHORPHAN 100-10MG/5
5 SYRUP ORAL 3 TIMES DAILY PRN
Qty: 120 ML | Refills: 0 | Status: SHIPPED | OUTPATIENT
Start: 2024-12-14 | End: 2024-12-24

## 2024-12-14 RX ORDER — BENZONATATE 200 MG/1
200 CAPSULE ORAL 3 TIMES DAILY PRN
Qty: 21 CAPSULE | Refills: 0 | Status: SHIPPED | OUTPATIENT
Start: 2024-12-14 | End: 2024-12-21

## 2024-12-14 RX ORDER — VITAMIN B COMPLEX
1000 TABLET ORAL DAILY
COMMUNITY

## 2025-01-22 RX ORDER — ATORVASTATIN CALCIUM 20 MG/1
20 TABLET, FILM COATED ORAL DAILY
Qty: 90 TABLET | Refills: 3 | Status: SHIPPED | OUTPATIENT
Start: 2025-01-22

## 2025-01-22 NOTE — TELEPHONE ENCOUNTER
PCP: Laurie Dumont APRN - NP    Last appt: 5/7/2024     Future Appointments   Date Time Provider Department Center   2/17/2025 11:00 AM Laurie Dumont APRN - NP [facility]       Requested Prescriptions     Pending Prescriptions Disp Refills    atorvastatin (LIPITOR) 20 MG tablet [Pharmacy Med Name: ATORVASTATIN 20 MG TAB[*]] 30 tablet 3     Sig: TAKE ONE TABLET BY MOUTH ONE TIME DAILY       Prior labs and Blood pressures:  BP Readings from Last 3 Encounters:   12/14/24 105/86   05/07/24 120/76   11/17/23 138/80     Lab Results   Component Value Date/Time     05/07/2024 02:50 PM    K 3.9 05/07/2024 02:50 PM     05/07/2024 02:50 PM    CO2 29 05/07/2024 02:50 PM    BUN 17 05/07/2024 02:50 PM     No results found for: \"HBA1C\", \"[id number]\"  Lab Results   Component Value Date/Time    CHOL 192 05/07/2024 02:50 PM    HDL 64 05/07/2024 02:50 PM     05/07/2024 02:50 PM    VLDL 28 05/07/2024 02:50 PM     No results found for: \"VITD3\"    Lab Results   Component Value Date/Time    TSH 1.20 05/07/2024 02:50 PM

## 2025-06-06 NOTE — TELEPHONE ENCOUNTER
PCP: Laurie Dumont APRN - NP    Last appt: 5/7/2024   No future appointments.    Requested Prescriptions     Pending Prescriptions Disp Refills    losartan (COZAAR) 100 MG tablet [Pharmacy Med Name: LOSARTAN 100 MG TAB[**]] 90 tablet 1     Sig: TAKE ONE TABLET BY MOUTH ONE TIME DAILY

## 2025-06-09 RX ORDER — LOSARTAN POTASSIUM 100 MG/1
100 TABLET ORAL DAILY
Qty: 90 TABLET | Refills: 3 | Status: SHIPPED | OUTPATIENT
Start: 2025-06-09